# Patient Record
Sex: FEMALE | Race: NATIVE HAWAIIAN OR OTHER PACIFIC ISLANDER | NOT HISPANIC OR LATINO | Employment: UNEMPLOYED | URBAN - METROPOLITAN AREA
[De-identification: names, ages, dates, MRNs, and addresses within clinical notes are randomized per-mention and may not be internally consistent; named-entity substitution may affect disease eponyms.]

---

## 2019-03-13 ENCOUNTER — HOSPITAL ENCOUNTER (EMERGENCY)
Facility: MEDICAL CENTER | Age: 54
End: 2019-03-14
Attending: EMERGENCY MEDICINE

## 2019-03-13 ENCOUNTER — APPOINTMENT (OUTPATIENT)
Dept: RADIOLOGY | Facility: MEDICAL CENTER | Age: 54
End: 2019-03-13
Attending: EMERGENCY MEDICINE

## 2019-03-13 ENCOUNTER — APPOINTMENT (OUTPATIENT)
Dept: RADIOLOGY | Facility: MEDICAL CENTER | Age: 54
End: 2019-03-13

## 2019-03-13 DIAGNOSIS — M79.605 BILATERAL LEG PAIN: ICD-10-CM

## 2019-03-13 DIAGNOSIS — R06.2 WHEEZING: ICD-10-CM

## 2019-03-13 DIAGNOSIS — M79.604 BILATERAL LEG PAIN: ICD-10-CM

## 2019-03-13 LAB
ALBUMIN SERPL BCP-MCNC: 4.1 G/DL (ref 3.2–4.9)
ALBUMIN/GLOB SERPL: 1.1 G/DL
ALP SERPL-CCNC: 75 U/L (ref 30–99)
ALT SERPL-CCNC: 19 U/L (ref 2–50)
ANION GAP SERPL CALC-SCNC: 14 MMOL/L (ref 0–11.9)
AST SERPL-CCNC: 24 U/L (ref 12–45)
BASOPHILS # BLD AUTO: 0.4 % (ref 0–1.8)
BASOPHILS # BLD: 0.04 K/UL (ref 0–0.12)
BILIRUB SERPL-MCNC: 0.4 MG/DL (ref 0.1–1.5)
BUN SERPL-MCNC: 14 MG/DL (ref 8–22)
CALCIUM SERPL-MCNC: 9.5 MG/DL (ref 8.5–10.5)
CHLORIDE SERPL-SCNC: 105 MMOL/L (ref 96–112)
CO2 SERPL-SCNC: 19 MMOL/L (ref 20–33)
COMMENT 1642: NORMAL
CREAT SERPL-MCNC: 0.81 MG/DL (ref 0.5–1.4)
EKG IMPRESSION: NORMAL
EOSINOPHIL # BLD AUTO: 0.44 K/UL (ref 0–0.51)
EOSINOPHIL NFR BLD: 4.6 % (ref 0–6.9)
ERYTHROCYTE [DISTWIDTH] IN BLOOD BY AUTOMATED COUNT: 53.5 FL (ref 35.9–50)
GLOBULIN SER CALC-MCNC: 3.8 G/DL (ref 1.9–3.5)
GLUCOSE SERPL-MCNC: 101 MG/DL (ref 65–99)
HCT VFR BLD AUTO: 55.8 % (ref 37–47)
HGB BLD-MCNC: 16.3 G/DL (ref 12–16)
IMM GRANULOCYTES # BLD AUTO: 0.02 K/UL (ref 0–0.11)
IMM GRANULOCYTES NFR BLD AUTO: 0.2 % (ref 0–0.9)
LYMPHOCYTES # BLD AUTO: 2.17 K/UL (ref 1–4.8)
LYMPHOCYTES NFR BLD: 22.7 % (ref 22–41)
MCH RBC QN AUTO: 29.3 PG (ref 27–33)
MCHC RBC AUTO-ENTMCNC: 29.2 G/DL (ref 33.6–35)
MCV RBC AUTO: 100.4 FL (ref 81.4–97.8)
MONOCYTES # BLD AUTO: 0.47 K/UL (ref 0–0.85)
MONOCYTES NFR BLD AUTO: 4.9 % (ref 0–13.4)
MORPHOLOGY BLD-IMP: NORMAL
NEUTROPHILS # BLD AUTO: 6.43 K/UL (ref 2–7.15)
NEUTROPHILS NFR BLD: 67.2 % (ref 44–72)
NRBC # BLD AUTO: 0 K/UL
NRBC BLD-RTO: 0 /100 WBC
PLATELET # BLD AUTO: 182 K/UL (ref 164–446)
PMV BLD AUTO: 10.1 FL (ref 9–12.9)
POTASSIUM SERPL-SCNC: 4.3 MMOL/L (ref 3.6–5.5)
PROT SERPL-MCNC: 7.9 G/DL (ref 6–8.2)
RBC # BLD AUTO: 5.56 M/UL (ref 4.2–5.4)
SODIUM SERPL-SCNC: 138 MMOL/L (ref 135–145)
WBC # BLD AUTO: 9.6 K/UL (ref 4.8–10.8)

## 2019-03-13 PROCEDURE — A9270 NON-COVERED ITEM OR SERVICE: HCPCS | Performed by: EMERGENCY MEDICINE

## 2019-03-13 PROCEDURE — 93005 ELECTROCARDIOGRAM TRACING: CPT | Performed by: EMERGENCY MEDICINE

## 2019-03-13 PROCEDURE — 84484 ASSAY OF TROPONIN QUANT: CPT

## 2019-03-13 PROCEDURE — 85025 COMPLETE CBC W/AUTO DIFF WBC: CPT

## 2019-03-13 PROCEDURE — 80053 COMPREHEN METABOLIC PANEL: CPT

## 2019-03-13 PROCEDURE — 93005 ELECTROCARDIOGRAM TRACING: CPT

## 2019-03-13 PROCEDURE — 71045 X-RAY EXAM CHEST 1 VIEW: CPT

## 2019-03-13 PROCEDURE — 83880 ASSAY OF NATRIURETIC PEPTIDE: CPT

## 2019-03-13 PROCEDURE — 700102 HCHG RX REV CODE 250 W/ 637 OVERRIDE(OP): Performed by: EMERGENCY MEDICINE

## 2019-03-13 RX ORDER — GABAPENTIN 300 MG/1
300 CAPSULE ORAL ONCE
Status: COMPLETED | OUTPATIENT
Start: 2019-03-13 | End: 2019-03-14

## 2019-03-13 RX ORDER — ALBUTEROL SULFATE 90 UG/1
2 AEROSOL, METERED RESPIRATORY (INHALATION) ONCE
Status: COMPLETED | OUTPATIENT
Start: 2019-03-13 | End: 2019-03-13

## 2019-03-13 RX ORDER — ACETAMINOPHEN 325 MG/1
975 TABLET ORAL ONCE
Status: COMPLETED | OUTPATIENT
Start: 2019-03-13 | End: 2019-03-13

## 2019-03-13 RX ADMIN — ALBUTEROL SULFATE 2 PUFF: 90 AEROSOL, METERED RESPIRATORY (INHALATION) at 23:01

## 2019-03-13 RX ADMIN — ACETAMINOPHEN 975 MG: 325 TABLET, FILM COATED ORAL at 23:01

## 2019-03-14 VITALS
TEMPERATURE: 97.5 F | OXYGEN SATURATION: 94 % | DIASTOLIC BLOOD PRESSURE: 90 MMHG | WEIGHT: 293 LBS | HEART RATE: 77 BPM | RESPIRATION RATE: 17 BRPM | SYSTOLIC BLOOD PRESSURE: 145 MMHG

## 2019-03-14 LAB
BNP SERPL-MCNC: 7 PG/ML (ref 0–100)
TROPONIN I SERPL-MCNC: <0.01 NG/ML (ref 0–0.04)

## 2019-03-14 PROCEDURE — 99284 EMERGENCY DEPT VISIT MOD MDM: CPT

## 2019-03-14 PROCEDURE — 700102 HCHG RX REV CODE 250 W/ 637 OVERRIDE(OP): Performed by: EMERGENCY MEDICINE

## 2019-03-14 PROCEDURE — A9270 NON-COVERED ITEM OR SERVICE: HCPCS | Performed by: EMERGENCY MEDICINE

## 2019-03-14 RX ORDER — ALBUTEROL SULFATE 90 UG/1
2 AEROSOL, METERED RESPIRATORY (INHALATION) EVERY 6 HOURS PRN
Qty: 8.5 G | Refills: 0 | Status: SHIPPED | OUTPATIENT
Start: 2019-03-14

## 2019-03-14 RX ORDER — PREDNISONE 50 MG/1
50 TABLET ORAL DAILY
Qty: 5 TAB | Refills: 0 | Status: SHIPPED | OUTPATIENT
Start: 2019-03-14 | End: 2019-03-19

## 2019-03-14 RX ORDER — GABAPENTIN 100 MG/1
100 CAPSULE ORAL 3 TIMES DAILY
Qty: 42 CAP | Refills: 0 | Status: SHIPPED | OUTPATIENT
Start: 2019-03-14 | End: 2019-03-28

## 2019-03-14 RX ADMIN — GABAPENTIN 300 MG: 300 CAPSULE ORAL at 00:14

## 2019-03-14 NOTE — ED PROVIDER NOTES
ED Provider Note    CHIEF COMPLAINT  Chief Complaint   Patient presents with   • Ankle Swelling     left    • Shortness of Breath     with orthopnea and increase sob with activity [id number]       HPI  Farida Neal is a 53 y.o. female who presents to the emergency department with chief complaint of bilateral foot and ankle pain left greater than right worsening over the last 1-2 months.  This pain is sharp and burning in nature and goes from her feet up through her ankles and sometimes even into her knees.  She has not had any swelling at the joints but sometimes she feels like both of the legs get generally swollen.  She states that she is on diclofenac for her gout and this is not really been helping the symptoms.  She is also endorsing a little shortness of breath may be over the last month.  She has however had some cough and congestion and states that she has a little more discomfort in her chest when she coughs.  Otherwise she does not have any chest pain is more just kind of shortness of breath with ambulation.  [place] and since she got here been a little harder to breathe now that she is at elevation.  Currently while resting she states she has that burning sensation in both of her feet and has not taken anything for pain since yesterday.    REVIEW OF SYSTEMS  Positives as above. Pertinent negatives include weakness or numbness chest pain nausea vomiting  All other review of systems are negative    PAST MEDICAL HISTORY   has a past medical history of Gout.    SOCIAL HISTORY  Social History     Social History Main Topics   • Smoking status: Never Smoker   • Smokeless tobacco: Never Used   • Alcohol use No   • Drug use: No   • Sexual activity: Not on file       SURGICAL HISTORY  patient denies any surgical history    CURRENT MEDICATIONS  Home Medications     Reviewed by Lisseth Sanchez R.N. (Registered Nurse) on 03/13/19 at 8855  Med List Status:  Complete   Medication Last Dose Status   NON SPECIFIED  Active                ALLERGIES  No Known Allergies    PHYSICAL EXAM  VITAL SIGNS: /106   Pulse 74   Temp 36.4 °C (97.5 °F) (Temporal)   Resp 18   Wt (!) 136.8 kg (301 lb 9.4 oz)   SpO2 94%    Pulse ox interpretation: I interpret this pulse ox as normal.  Constitutional: Alert in no apparent distress.  HENT: Normocephalic atraumatic, MMM  Eyes: PER, Conjunctiva normal, Non-icteric.   Neck: Normal range of motion, No tenderness, Supple, No stridor.   Cardiovascular: Regular rate and rhythm, no murmurs.  DP pulses 2+ bilaterally  Thorax & Lungs: faint coarse wheeze b/l, No respiratory distress, No chest tenderness.   Abdomen: Bowel sounds normal, Soft, No tenderness, No pulsatile masses. No peritoneal signs.  Skin: Warm, Dry, No erythema, No rash.   Back: No bony tenderness, No CVA tenderness.   Extremities: Intact distal pulses, No edema, she does endorsing some tenderness of both ankles though there is no joint swelling on either no redness no erythema no warmth, cap refill less than 3 seconds sensation intact light touch no cyanosis  Neurologic: Alert and oriented x3, No focal deficits noted.     DIFFERENTIAL DIAGNOSIS AND WORK UP PLAN    This is a 53 y.o. female who presents with pain in her lower extremities was kind of sounds like a neuropathy that burning pain starts in her feet and gets a little worse with activity however the shortness of breath with activity is also Combined with a cough and shortness of breath with nasal congestion recent illness she does have a mild wheeze bilaterally.  EKG is quite unremarkable though this could be an atypical ACS or fluid overload this could be a pulmonary reaction to her recent viral illness or bronchitis there is no evidence of edema in her lower extremities no signs of infection in her ankles.  Will evaluate for signs of fluid overload or electrolyte of normalities diabetes we will treat her with  Tylenol gabapentin and handheld inhaler and reassess    DIAGNOSTIC STUDIES / PROCEDURES    EKG  Results for orders placed or performed during the hospital encounter of 19   EKG   Result Value Ref Range    Report       [facility] Emergency Dept.    Test Date:  2019  Pt Name:    ARABELLA TRUONG                 Department: ER  MRN:        [id number]                      Room:  Gender:     Female                       Technician: [id number]  :        1965                   Requested By:ER TRIAGE PROTOCOL  Order #:    [id number]                    Reading MD: Jessica Ruvalcaba MD    Measurements  Intervals                                Axis  Rate:       83                           P:          40  IA:         160                          QRS:        12  QRSD:       96                           T:          -17  QT:         400  QTc:        470    Interpretive Statements  Sinus rhythm rate 83 no ST elevations or ST depressions does have LVH is T  wave  inversion in lead III and little flattening in aVF but normal in lead II  which is  nonspecific and nondiagnostic no diagnostic Q waves normal intervals normal  axis  No previous ECG available for comparison    Electronically Signed On  3- 22:43:36 PDT by Jessica Ruvalcaba MD         LABS  Pertinent Lab Findings  CBC with an elevated hemoglobin otherwise within normal limits injury and a normal glucose normal BNP normal troponin      RADIOLOGY  DX-CHEST-PORTABLE (1 VIEW)   Final Result      No acute cardiopulmonary abnormality identified.        The radiologist's interpretation of all radiological studies have been reviewed by me.      COURSE & MEDICAL DECISION MAKING  Pertinent Labs & Imaging studies reviewed. (See chart for details)    12:29 AM  Reassess patient at bedside she is feeling better after the albuterol and gabapentin, her leg pain sounds more like nerve pain she was having some wheezing that improved with the albuterol which  patient feeling more short of breath could be due to the recent change in her location versus a viral illness she was given steroids and some gabapentin to help with the discomfort in her lower extremities I have low concern about this being cardiac related or pulmonary embolism.  She is not having any if she was her heart score would be a 3.  She feels comfortable going home with her daughter and they will return to the emergency department for any new or worsening symptoms fevers chills chest pain actual edema    /90   Pulse 77   Temp 36.4 °C (97.5 °F) (Temporal)   Resp 17   Wt (!) 136.8 kg (301 lb 9.4 oz)   SpO2 94%     The patient will return for new or worsening symptoms and is stable at the time of discharge.    The patient is referred to a primary physician for blood pressure management, diabetic screening, and for all other preventative health concerns.    DISPOSITION:  Patient will be discharged home in stable condition.    FOLLOW UP:  [facility], Emergency Dept  [address] [address] [phone]    If symptoms worsen    [address] [address]  [phone]  Schedule an appointment as soon as possible for a visit   for blood pressure recheck      OUTPATIENT MEDICATIONS:  Discharge Medication List as of 3/14/2019 12:45 AM      START taking these medications    Details   albuterol 108 (90 Base) MCG/ACT Aero Soln inhalation aerosol Inhale 2 Puffs by mouth every 6 hours as needed for Shortness of Breath., Disp-8.5 g, R-0, Print Rx Paper      predniSONE (DELTASONE) 50 MG Tab Take 1 Tab by mouth every day for 5 days., Disp-5 Tab, R-0, Print Rx Paper      gabapentin (NEURONTIN) 100 MG Cap Take 1 Cap by mouth 3 times a day for 14 days., Disp-42 Cap, R-0, Print Rx Paper               FINAL IMPRESSION  1. Wheezing    2. Bilateral leg pain        Electronically signed by: Jessica Ruvalcaba, 3/13/2019 10:42 PM    This dictation  has been created using voice recognition software and/or scribes. The accuracy of the dictation is limited by the abilities of the software and the expertise of the scribes. I expect there may be some errors of grammar and possibly content. I made every attempt to manually correct the errors within my dictation. However, errors related to voice recognition software and/or scribes may still exist and should be interpreted within the appropriate context.

## 2019-03-14 NOTE — ED NOTES
Chief Complaint   Patient presents with   • Ankle Swelling     left    • Shortness of Breath     with orthopnea and increase sob with activity [id number]     Pt ambulated to triage with above complaints. Pt's daughter said that she is taking her gout medicine but not working.   Pt to ekg.

## 2019-03-20 ENCOUNTER — HOSPITAL ENCOUNTER (EMERGENCY)
Facility: MEDICAL CENTER | Age: 54
End: 2019-03-20
Attending: EMERGENCY MEDICINE

## 2019-03-20 ENCOUNTER — APPOINTMENT (OUTPATIENT)
Dept: RADIOLOGY | Facility: MEDICAL CENTER | Age: 54
End: 2019-03-20
Attending: EMERGENCY MEDICINE

## 2019-03-20 VITALS
WEIGHT: 293 LBS | BODY MASS INDEX: 41.02 KG/M2 | DIASTOLIC BLOOD PRESSURE: 108 MMHG | OXYGEN SATURATION: 97 % | HEART RATE: 80 BPM | SYSTOLIC BLOOD PRESSURE: 146 MMHG | TEMPERATURE: 98 F | RESPIRATION RATE: 16 BRPM | HEIGHT: 71 IN

## 2019-03-20 DIAGNOSIS — M25.50 ARTHRALGIA, UNSPECIFIED JOINT: ICD-10-CM

## 2019-03-20 LAB
ANION GAP SERPL CALC-SCNC: 7 MMOL/L (ref 0–11.9)
BASOPHILS # BLD AUTO: 0.3 % (ref 0–1.8)
BASOPHILS # BLD: 0.03 K/UL (ref 0–0.12)
BUN SERPL-MCNC: 15 MG/DL (ref 8–22)
CALCIUM SERPL-MCNC: 9.3 MG/DL (ref 8.5–10.5)
CHLORIDE SERPL-SCNC: 103 MMOL/L (ref 96–112)
CO2 SERPL-SCNC: 27 MMOL/L (ref 20–33)
CREAT SERPL-MCNC: 0.85 MG/DL (ref 0.5–1.4)
EOSINOPHIL # BLD AUTO: 0.68 K/UL (ref 0–0.51)
EOSINOPHIL NFR BLD: 6.7 % (ref 0–6.9)
ERYTHROCYTE [DISTWIDTH] IN BLOOD BY AUTOMATED COUNT: 48 FL (ref 35.9–50)
GLUCOSE SERPL-MCNC: 99 MG/DL (ref 65–99)
HCT VFR BLD AUTO: 48.6 % (ref 37–47)
HGB BLD-MCNC: 15.7 G/DL (ref 12–16)
IMM GRANULOCYTES # BLD AUTO: 0.02 K/UL (ref 0–0.11)
IMM GRANULOCYTES NFR BLD AUTO: 0.2 % (ref 0–0.9)
LYMPHOCYTES # BLD AUTO: 3.67 K/UL (ref 1–4.8)
LYMPHOCYTES NFR BLD: 36.3 % (ref 22–41)
MCH RBC QN AUTO: 29.4 PG (ref 27–33)
MCHC RBC AUTO-ENTMCNC: 32.3 G/DL (ref 33.6–35)
MCV RBC AUTO: 91 FL (ref 81.4–97.8)
MONOCYTES # BLD AUTO: 0.47 K/UL (ref 0–0.85)
MONOCYTES NFR BLD AUTO: 4.7 % (ref 0–13.4)
NEUTROPHILS # BLD AUTO: 5.23 K/UL (ref 2–7.15)
NEUTROPHILS NFR BLD: 51.8 % (ref 44–72)
NRBC # BLD AUTO: 0 K/UL
NRBC BLD-RTO: 0 /100 WBC
PLATELET # BLD AUTO: 246 K/UL (ref 164–446)
PMV BLD AUTO: 9 FL (ref 9–12.9)
POTASSIUM SERPL-SCNC: 3.4 MMOL/L (ref 3.6–5.5)
RBC # BLD AUTO: 5.34 M/UL (ref 4.2–5.4)
SODIUM SERPL-SCNC: 137 MMOL/L (ref 135–145)
WBC # BLD AUTO: 10.1 K/UL (ref 4.8–10.8)

## 2019-03-20 PROCEDURE — 93970 EXTREMITY STUDY: CPT

## 2019-03-20 PROCEDURE — 85025 COMPLETE CBC W/AUTO DIFF WBC: CPT

## 2019-03-20 PROCEDURE — 700102 HCHG RX REV CODE 250 W/ 637 OVERRIDE(OP): Performed by: EMERGENCY MEDICINE

## 2019-03-20 PROCEDURE — 80048 BASIC METABOLIC PNL TOTAL CA: CPT

## 2019-03-20 PROCEDURE — 99283 EMERGENCY DEPT VISIT LOW MDM: CPT

## 2019-03-20 PROCEDURE — A9270 NON-COVERED ITEM OR SERVICE: HCPCS | Performed by: EMERGENCY MEDICINE

## 2019-03-20 RX ORDER — ACETAMINOPHEN 325 MG/1
650 TABLET ORAL ONCE
Status: COMPLETED | OUTPATIENT
Start: 2019-03-20 | End: 2019-03-20

## 2019-03-20 RX ADMIN — ACETAMINOPHEN 650 MG: 325 TABLET, FILM COATED ORAL at 13:37

## 2019-03-20 ASSESSMENT — PAIN SCALES - WONG BAKER: WONGBAKER_NUMERICALRESPONSE: HURTS EVEN MORE

## 2019-03-20 ASSESSMENT — LIFESTYLE VARIABLES: DO YOU DRINK ALCOHOL: NO

## 2019-03-20 NOTE — ED TRIAGE NOTES
Chief Complaint   Patient presents with   • Ankle Pain     ambulates to triage c/o bilateral ankle pain x 1-2 months. was seen 1 week ago for same and was given gabapentin for nerve pain w/relief, but pain came back yesterday after doing a lot of walking. denies trauma.     Triage process explained. Instructed to notify staff for any worsening symptoms.

## 2019-03-20 NOTE — ED PROVIDER NOTES
"ED Provider Note    CHIEF COMPLAINT  Chief Complaint   Patient presents with   • Ankle Pain     ambulates to triage c/o bilateral ankle pain x 1-2 months. was seen 1 week ago for same and was given gabapentin for nerve pain w/relief, but pain came back yesterday after doing a lot of walking. denies trauma.       HPI  Farida Neal is a 53 y.o. female who presents to the emerge department for continued bilateral lower extremity pain and swelling.  Patient reports having recently been in the ER with similar complaint but at the time also had additional symptoms of cough.  After that evaluation they were discharged with outpatient follow-up with [facility] clinic which was not yet completed.  She has been taking the prescribed medications as given.  She is now been taking gabapentin for approximately 1 week with slight improvement of her overall symptoms.  She notes that the cough has resolved with the use of the inhaler.  She has not had any chest pain or further shortness of breath.  No nausea or vomiting.  No diaphoresis.  Yesterday they had a long day of walking while shopping and last night she noticed discomfort to bilateral knees and ankles.  She was not having any pain while ambulating during the day.  No new injuries or trauma.    REVIEW OF SYSTEMS  See HPI for further details. All other systems are negative.     PAST MEDICAL HISTORY   has a past medical history of Gout.    SOCIAL HISTORY  Social History     Social History Main Topics   • Smoking status: Never Smoker   • Smokeless tobacco: Never Used   • Alcohol use No   • Drug use: No   • Sexual activity: Not on file       SURGICAL HISTORY  patient denies any surgical history    CURRENT MEDICATIONS  Home Medications    **Home medications have not yet been reviewed for this encounter**         ALLERGIES  No Known Allergies    PHYSICAL EXAM  VITAL SIGNS: /108   Pulse 80   Temp 36.7 °C (98 °F) (Temporal)   Resp 16   Ht 1.8 m (5' 10.87\")   Wt (!) 138.8 " kg (306 lb)   SpO2 97%   BMI 42.84 kg/m²  @CHERELLE[[id number]::@   Pulse ox interpretation: I interpret this pulse ox as normal.  Constitutional: Alert in no apparent distress.  Obese  HENT: No signs of trauma, Bilateral external ears normal, Nose normal.   Eyes: Pupils are equal and reactive, Conjunctiva normal, Non-icteric.   Neck: Normal range of motion, No tenderness, Supple, No stridor.   Cardiovascular: Regular rate and rhythm, no murmurs.   Thorax & Lungs: Normal breath sounds, No respiratory distress, No wheezing, No chest tenderness.   Abdomen: Bowel sounds normal, Soft, No tenderness, No masses, No pulsatile masses. No peritoneal signs.  Skin: Warm, Dry, No erythema, No rash.   Back: No bony tenderness, No CVA tenderness.   Extremities: Intact distal pulses, No edema,, No cyanosis, mild tenderness to bilateral calves.  No appreciable edema bilaterally although patient is overweight and difficult to assess.  Full range of motion of hips, knees, ankles.  5 out of 5 strength equal bilaterally.  Musculoskeletal: Good range of motion in all major joints. No tenderness to palpation or major deformities noted.   Neurologic: Alert , Normal motor function, Normal sensory function, No focal deficits noted.   Psychiatric: Affect normal, Judgment normal, Mood normal.       DIAGNOSTIC STUDIES / PROCEDURES      LABS  Results for orders placed or performed during the hospital encounter of 03/20/19   CBC WITH DIFFERENTIAL   Result Value Ref Range    WBC 10.1 4.8 - 10.8 K/uL    RBC 5.34 4.20 - 5.40 M/uL    Hemoglobin 15.7 12.0 - 16.0 g/dL    Hematocrit 48.6 (H) 37.0 - 47.0 %    MCV 91.0 81.4 - 97.8 fL    MCH 29.4 27.0 - 33.0 pg    MCHC 32.3 (L) 33.6 - 35.0 g/dL    RDW 48.0 35.9 - 50.0 fL    Platelet Count 246 164 - 446 K/uL    MPV 9.0 9.0 - 12.9 fL    Neutrophils-Polys 51.80 44.00 - 72.00 %    Lymphocytes 36.30 22.00 - 41.00 %    Monocytes 4.70 0.00 - 13.40 %    Eosinophils 6.70 0.00 - 6.90 %    Basophils 0.30 0.00 - 1.80 %     Immature Granulocytes 0.20 0.00 - 0.90 %    Nucleated RBC 0.00 /100 WBC    Neutrophils (Absolute) 5.23 2.00 - 7.15 K/uL    Lymphs (Absolute) 3.67 1.00 - 4.80 K/uL    Monos (Absolute) 0.47 0.00 - 0.85 K/uL    Eos (Absolute) 0.68 (H) 0.00 - 0.51 K/uL    Baso (Absolute) 0.03 0.00 - 0.12 K/uL    Immature Granulocytes (abs) 0.02 0.00 - 0.11 K/uL    NRBC (Absolute) 0.00 K/uL   BASIC METABOLIC PANEL   Result Value Ref Range    Sodium 137 135 - 145 mmol/L    Potassium 3.4 (L) 3.6 - 5.5 mmol/L    Chloride 103 96 - 112 mmol/L    Co2 27 20 - 33 mmol/L    Glucose 99 65 - 99 mg/dL    Bun 15 8 - 22 mg/dL    Creatinine 0.85 0.50 - 1.40 mg/dL    Calcium 9.3 8.5 - 10.5 mg/dL    Anion Gap 7.0 0.0 - 11.9   ESTIMATED GFR   Result Value Ref Range    GFR If African American >60 >60 mL/min/1.73 m 2    GFR If Non African American >60 >60 mL/min/1.73 m 2           RADIOLOGY  US-EXTREMITY VENOUS LOWER BILAT   Final Result              COURSE & MEDICAL DECISION MAKING  Pertinent Labs & Imaging studies reviewed. (See chart for details)  Patient presenting with the above complaint.  Recurrent joint discomfort after prolonged ambulation yesterday.  Patient has had recent evaluation in the ER with similar presentation with the additional symptoms as noted above.  Chart review reveals significant cardiopulmonary evaluation which was negative.  I have added ultrasound of her lower extremity to rule out DVT which is negative.  She is feeling slightly better after Tylenol.  I will have him continue with Tylenol in addition to the ibuprofen and gabapentin as previously prescribed.  She is understanding she is to follow-up with [facility] for outpatient follow-up as well.      The patient will return for worsening symptoms and is stable at the time of discharge. The patient verbalizes understanding and will comply.    FINAL IMPRESSION  1. Arthralgia, unspecified joint            Electronically signed by: Morales Sy, 3/20/2019 1:15  PM

## 2019-03-20 NOTE — ED NOTES
Discharge orders received, instructions and education given, follow-up discussed, pt verbalized understanding.

## 2024-07-10 NOTE — ED NOTES
Pt medicated per MAR.  called for blood draw.   Additional Notes (Optional): Benign lesion, informed patient that the only definitive treatment is full excision. Patient expressed understanding. Hide Additional Notes?: No Detail Level: Detailed